# Patient Record
Sex: MALE | Race: WHITE | NOT HISPANIC OR LATINO | Employment: FULL TIME | URBAN - METROPOLITAN AREA
[De-identification: names, ages, dates, MRNs, and addresses within clinical notes are randomized per-mention and may not be internally consistent; named-entity substitution may affect disease eponyms.]

---

## 2023-03-24 PROBLEM — K44.9 GASTROESOPHAGEAL REFLUX DISEASE WITH HIATAL HERNIA: Status: ACTIVE | Noted: 2023-03-24

## 2023-03-24 PROBLEM — J30.9 ALLERGIC RHINITIS: Status: ACTIVE | Noted: 2023-03-24

## 2023-03-24 PROBLEM — K76.0 HEPATIC STEATOSIS: Status: ACTIVE | Noted: 2023-03-24

## 2023-03-24 PROBLEM — G47.33 OSA ON CPAP: Status: ACTIVE | Noted: 2023-03-24

## 2023-03-24 PROBLEM — R74.01 TRANSAMINITIS: Status: ACTIVE | Noted: 2023-03-24

## 2023-03-24 PROBLEM — M25.78 CERVICAL OSTEOPHYTE: Status: ACTIVE | Noted: 2023-03-24

## 2023-03-24 PROBLEM — Z86.16 HISTORY OF COVID-19: Status: ACTIVE | Noted: 2023-03-24

## 2023-03-24 PROBLEM — E78.5 HYPERLIPIDEMIA: Status: ACTIVE | Noted: 2023-03-24

## 2023-03-24 PROBLEM — R09.81 NASAL CONGESTION: Status: ACTIVE | Noted: 2023-03-24

## 2023-03-24 PROBLEM — K21.9 GASTROESOPHAGEAL REFLUX DISEASE WITH HIATAL HERNIA: Status: ACTIVE | Noted: 2023-03-24

## 2023-03-24 PROBLEM — M54.2 NECK PAIN: Status: ACTIVE | Noted: 2023-03-24

## 2023-03-24 PROBLEM — F41.8 DEPRESSION WITH ANXIETY: Status: ACTIVE | Noted: 2023-03-24

## 2023-03-24 RX ORDER — SULFACETAMIDE SODIUM 100 MG/ML
LOTION TOPICAL
COMMUNITY
Start: 2023-01-16

## 2023-03-24 RX ORDER — OMEPRAZOLE 20 MG/1
1 CAPSULE, DELAYED RELEASE ORAL DAILY
COMMUNITY
Start: 2021-05-20

## 2023-03-24 RX ORDER — FLUTICASONE PROPIONATE 50 MCG
2 SPRAY, SUSPENSION (ML) NASAL DAILY
COMMUNITY
Start: 2022-02-22

## 2023-03-24 RX ORDER — PAROXETINE HYDROCHLORIDE 20 MG/1
1 TABLET, FILM COATED ORAL DAILY
COMMUNITY
Start: 2023-02-16 | End: 2023-05-16 | Stop reason: SDUPTHER

## 2023-04-20 LAB
ALPHA 1 ANTITRYPSIN (MG/DL) IN SER/PLAS: 126 MG/DL (ref 84–218)
CERULOPLASMIN (MG/DL) IN SER/PLAS: 27 MG/DL (ref 20–60)
FERRITIN (UG/LL) IN SER/PLAS: 306 UG/L (ref 20–300)
HEPATITIS A VIRUS IGM AB PRESENCE IN SER/PLAS BY IMMUNOASSAY: NONREACTIVE
HEPATITIS B VIRUS CORE AB (PRESENCE) IN SER/PLAS BY IMM: NONREACTIVE
HEPATITIS B VIRUS SURFACE AG PRESENCE IN SERUM: NONREACTIVE
HEPATITIS C VIRUS AB PRESENCE IN SERUM: NONREACTIVE
IRON (UG/DL) IN SER/PLAS: 223 UG/DL (ref 35–150)
IRON BINDING CAPACITY (UG/DL) IN SER/PLAS: 366 UG/DL (ref 240–445)
IRON SATURATION (%) IN SER/PLAS: 61 % (ref 25–45)

## 2023-04-21 LAB
ANTI-NUCLEAR ANTIBODY (ANA): NEGATIVE
ANTI-SMOOTH MUSCLE ANTIBODY: NEGATIVE
MITOCHONDRIAL ANTIBODY: NEGATIVE

## 2023-05-09 ENCOUNTER — APPOINTMENT (OUTPATIENT)
Dept: PRIMARY CARE | Facility: CLINIC | Age: 38
End: 2023-05-09
Payer: COMMERCIAL

## 2023-05-09 LAB
HEPATITIS A TOTAL AB INTERPRETATION: REACTIVE
HEPATITIS B VIRUS SURFACE AB (MIU/ML) IN SERUM: 765.9 MIU/ML

## 2023-05-15 LAB — HEMOCHROMATOSIS INTERPRETATION: ABNORMAL

## 2023-05-30 ENCOUNTER — OFFICE VISIT (OUTPATIENT)
Dept: PRIMARY CARE | Facility: CLINIC | Age: 38
End: 2023-05-30
Payer: COMMERCIAL

## 2023-05-30 VITALS
TEMPERATURE: 98.1 F | WEIGHT: 215 LBS | DIASTOLIC BLOOD PRESSURE: 68 MMHG | SYSTOLIC BLOOD PRESSURE: 124 MMHG | BODY MASS INDEX: 32.22 KG/M2

## 2023-05-30 DIAGNOSIS — G47.33 OSA ON CPAP: ICD-10-CM

## 2023-05-30 DIAGNOSIS — R74.01 TRANSAMINITIS: ICD-10-CM

## 2023-05-30 DIAGNOSIS — F41.8 DEPRESSION WITH ANXIETY: ICD-10-CM

## 2023-05-30 DIAGNOSIS — K76.0 HEPATIC STEATOSIS: Primary | ICD-10-CM

## 2023-05-30 DIAGNOSIS — E78.5 HYPERLIPIDEMIA, UNSPECIFIED HYPERLIPIDEMIA TYPE: ICD-10-CM

## 2023-05-30 DIAGNOSIS — L70.8 OTHER ACNE: ICD-10-CM

## 2023-05-30 PROCEDURE — 1036F TOBACCO NON-USER: CPT | Performed by: FAMILY MEDICINE

## 2023-05-30 PROCEDURE — 99214 OFFICE O/P EST MOD 30 MIN: CPT | Performed by: FAMILY MEDICINE

## 2023-05-30 NOTE — PROGRESS NOTES
Subjective   Patient ID: [id number]     Kaden Still is a 37 y.o. male who presents for Med Management.    HPI  Here with wife and baby who states that his acne is impossible for him to not pick at;  he sees Dr Luu and is on a cream that he feels is not as effective;  he doesn't have other obsessions or routines;  he admits that he only takes his acne medicine once a day at most    Kaden states that hi sanxiety was improved by the increase of his paroxetine from 10 to 20 mg but 5 week old, Nena,  at home has been affecting his mood somewhat negatively    Has positive test for hemochromatosis and to see Dr Rashid later today    Review of Systems    ENDO- No change in hair, voice, skin, weight or temperature tolerance   MSK-No locking, giving way/swelling of joints  NEURO- No headaches, hx of concussion, falls in the last year or seizure;  using his CPAP  DERM-see HPI    Objective     /68 (BP Location: Left arm, Patient Position: Sitting)   Temp 36.7 °C (98.1 °F)   Wt 97.5 kg (215 lb)   BMI 32.22 kg/m²      Physical Exam    Neck-supple without lymphadenopathy or thyromegaly; no carotid bruits  Throat- without erythema or exudate, uvula in midlineNeck-supple without lymphadenopathy or thyromegaly; no carotid bruits  Heart- regular rate and rhythm, normal s1 and s2 without murmur or gallop  Lungs-clear to auscultation  Abdomen-soft, positive bowel sounds, without masses, HSmegaly or pain     Assessment/Plan     Problem List Items Addressed This Visit          Nervous    DEWEY on CPAP       Digestive    Hepatic steatosis - Primary       Other    Depression with anxiety    Transaminitis    Hyperlipidemia    Other acne     Great job losing weight.  Keep it up with calorie counting!    You are eligible for the bivalent COVID vaccine.    Follow up in three months for your next routine appointment.  Sanket Burns MD

## 2023-05-30 NOTE — PATIENT INSTRUCTIONS
Great job losing weight.  Keep it up with calorie counting!    You are eligible for the bivalent COVID vaccine.    Follow up in three months for your next routine appointment.

## 2023-06-09 ENCOUNTER — APPOINTMENT (OUTPATIENT)
Dept: PRIMARY CARE | Facility: CLINIC | Age: 38
End: 2023-06-09
Payer: COMMERCIAL

## 2023-07-18 DIAGNOSIS — E78.5 HYPERLIPIDEMIA, UNSPECIFIED HYPERLIPIDEMIA TYPE: ICD-10-CM

## 2023-07-19 RX ORDER — ATORVASTATIN CALCIUM 10 MG/1
TABLET, FILM COATED ORAL
Qty: 30 TABLET | Refills: 0 | Status: SHIPPED | OUTPATIENT
Start: 2023-07-19

## 2023-08-28 ENCOUNTER — APPOINTMENT (OUTPATIENT)
Dept: PRIMARY CARE | Facility: CLINIC | Age: 38
End: 2023-08-28
Payer: COMMERCIAL